# Patient Record
Sex: MALE | Race: WHITE | NOT HISPANIC OR LATINO | Employment: OTHER | URBAN - METROPOLITAN AREA
[De-identification: names, ages, dates, MRNs, and addresses within clinical notes are randomized per-mention and may not be internally consistent; named-entity substitution may affect disease eponyms.]

---

## 2023-02-09 ENCOUNTER — TELEPHONE (OUTPATIENT)
Dept: NEUROSURGERY | Facility: CLINIC | Age: 73
End: 2023-02-09
Payer: MEDICARE

## 2023-02-09 NOTE — TELEPHONE ENCOUNTER
Spoke with patient. Informed him we received a referral for him to be evaluated by Dr. Oro. Patient reports his MRI scan is scheduled on 2/17/23 at [facility]. I advised patient to get a disc of the images and have it shipped to us for his visit. Gave patient address to send disc. Patient states he does not want to come in on 2/21 and 2/22 due to Jaime Nagel. I informed patient that I will speak with Dr. Oro to see if he can see him on 2/23 and 2/24. Patient verbalizes understanding and agreement.

## 2023-02-10 ENCOUNTER — TELEPHONE (OUTPATIENT)
Dept: NEUROSURGERY | Facility: CLINIC | Age: 73
End: 2023-02-10
Payer: MEDICARE

## 2023-02-10 NOTE — TELEPHONE ENCOUNTER
Spoke with patient. Offered him an appointment on 2/28 at 11:30am. Patient verbalizes understanding and agreement. Patient reports he will mail the disc of his MRI as well.

## 2023-02-23 ENCOUNTER — TELEPHONE (OUTPATIENT)
Dept: NEUROSURGERY | Facility: CLINIC | Age: 73
End: 2023-02-23
Payer: MEDICARE

## 2023-02-28 ENCOUNTER — OFFICE VISIT (OUTPATIENT)
Dept: NEUROSURGERY | Facility: CLINIC | Age: 73
End: 2023-02-28
Payer: MEDICARE

## 2023-02-28 DIAGNOSIS — R90.0 INTRACRANIAL MASS: Primary | ICD-10-CM

## 2023-02-28 PROCEDURE — 99205 PR OFFICE/OUTPT VISIT, NEW, LEVL V, 60-74 MIN: ICD-10-PCS | Mod: S$PBB,,, | Performed by: NEUROLOGICAL SURGERY

## 2023-02-28 PROCEDURE — 99999 PR PBB SHADOW E&M-EST. PATIENT-LVL II: ICD-10-PCS | Mod: PBBFAC,,, | Performed by: NEUROLOGICAL SURGERY

## 2023-02-28 PROCEDURE — 99999 PR PBB SHADOW E&M-EST. PATIENT-LVL II: CPT | Mod: PBBFAC,,, | Performed by: NEUROLOGICAL SURGERY

## 2023-02-28 PROCEDURE — 99205 OFFICE O/P NEW HI 60 MIN: CPT | Mod: S$PBB,,, | Performed by: NEUROLOGICAL SURGERY

## 2023-02-28 PROCEDURE — 99212 OFFICE O/P EST SF 10 MIN: CPT | Mod: PBBFAC | Performed by: NEUROLOGICAL SURGERY

## 2023-02-28 RX ORDER — TAMSULOSIN HYDROCHLORIDE 0.4 MG/1
CAPSULE ORAL
COMMUNITY
Start: 2023-01-01

## 2023-02-28 RX ORDER — ACETAMINOPHEN 500 MG
5000 TABLET ORAL
COMMUNITY

## 2023-02-28 RX ORDER — TRIAMCINOLONE ACETONIDE 1 MG/G
CREAM TOPICAL
COMMUNITY

## 2023-02-28 RX ORDER — RAMIPRIL 1.25 MG/1
CAPSULE ORAL
COMMUNITY

## 2023-02-28 RX ORDER — METRONIDAZOLE 7.5 MG/G
CREAM TOPICAL
COMMUNITY

## 2023-02-28 RX ORDER — ASPIRIN 81 MG/1
81 TABLET ORAL
COMMUNITY

## 2023-02-28 RX ORDER — LORATADINE 10 MG/1
10 TABLET ORAL DAILY PRN
COMMUNITY
Start: 2022-11-23

## 2023-02-28 RX ORDER — MULTIVITAMIN
1 TABLET ORAL DAILY
COMMUNITY

## 2023-02-28 RX ORDER — FAMOTIDINE 40 MG/1
TABLET, FILM COATED ORAL
COMMUNITY
Start: 2022-10-21

## 2023-02-28 RX ORDER — ROSUVASTATIN CALCIUM 10 MG/1
TABLET, COATED ORAL
COMMUNITY

## 2023-02-28 RX ORDER — AMOXICILLIN 500 MG
1000 CAPSULE ORAL
COMMUNITY

## 2023-02-28 RX ORDER — FLUTICASONE PROPIONATE 50 MCG
SPRAY, SUSPENSION (ML) NASAL
COMMUNITY
Start: 2023-02-01
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
## 2023-03-02 ENCOUNTER — TELEPHONE (OUTPATIENT)
Dept: NEUROSURGERY | Facility: CLINIC | Age: 73
End: 2023-03-02
Payer: MEDICARE

## 2023-03-02 DIAGNOSIS — R90.0 INTRACRANIAL MASS: Primary | ICD-10-CM

## 2023-07-12 ENCOUNTER — TELEPHONE (OUTPATIENT)
Dept: NEUROSURGERY | Facility: CLINIC | Age: 73
End: 2023-07-12
Payer: MEDICARE

## 2023-07-12 NOTE — TELEPHONE ENCOUNTER
Called and spoke with pt. Orders faxed to Dr. Rueda. Explained to pt needs to make sure CT is done as Dr. Oro ordered with special orders. Highlighted on order. Also, explained need to send disc prior to appt for us to upload. Pt verbalized understanding.

## 2023-07-12 NOTE — TELEPHONE ENCOUNTER
----- Message from Alma Delia Gillespie RN sent at 7/11/2023  4:38 PM CDT -----  Regarding: FW: orders  Contact: @[phone]    ----- Message -----  From: Chon Zhao  Sent: 7/11/2023   1:50 PM CDT  To: [facility] Staff  Subject: orders                                           Pt calling wondering if  could fax orders for mri and ct scan and whatever else needed to pts dr in [place] where he can get images done so he doesn't have to take two trips to [facility].... Dr name deshaun gaona.... phone #[phone]... fax#[phone] ... pls call and adv@[phone] ... states would like dr tobar  include address he would like results sent to after

## 2023-07-25 ENCOUNTER — TELEPHONE (OUTPATIENT)
Dept: NEUROSURGERY | Facility: CLINIC | Age: 73
End: 2023-07-25
Payer: MEDICARE

## 2023-07-25 NOTE — TELEPHONE ENCOUNTER
----- Message from Ting Hogan sent at 7/25/2023  1:07 PM CDT -----  Contact: [phone]  Ajit Anton calling regarding Patient Advice (message) Pt would like a call back from the nurse pt states that he was suppose to have a MRI and a CT done but Pt his disc was power share into [facility] so the office should have a copy.

## 2023-07-25 NOTE — TELEPHONE ENCOUNTER
Called pt to reschedule appt due to Dr. Oro will be out. Rescheduled to 08/29/23. Left vm with new appt info and clinic number to return call.

## 2023-08-03 ENCOUNTER — TELEPHONE (OUTPATIENT)
Dept: NEUROSURGERY | Facility: CLINIC | Age: 73
End: 2023-08-03
Payer: MEDICARE

## 2023-08-03 NOTE — TELEPHONE ENCOUNTER
Returned call to pt. Rescheduled for next available mid morning appt per pt. Pt confirmed rescheduled appt.

## 2023-08-03 NOTE — TELEPHONE ENCOUNTER
----- Message from Mary Jaquez sent at 8/3/2023  9:59 AM CDT -----  Regarding: appt  Contact: pt  Pt calling to get a earlier time for appt on 8-29  would like a morning appt , please call         Confirmed patient's contact info below:  Contact Name: Ajit Santo  Phone Number: [phone]

## 2023-09-21 ENCOUNTER — TELEPHONE (OUTPATIENT)
Dept: NEUROSURGERY | Facility: CLINIC | Age: 73
End: 2023-09-21
Payer: MEDICARE

## 2023-09-21 NOTE — TELEPHONE ENCOUNTER
----- Message from Emily Temple sent at 9/21/2023  9:13 AM CDT -----  Regarding: appt change  Contact: pt [phone]  PATIENTCALL     Pt is calling to speak with someone in provider office regarding he has an appt on 10/02 that he needs to have changed to a virtual appt he states he is having surgery an will not be able to drive here to the visit  is asking for a return call please call pt at  [phone]

## 2023-10-02 ENCOUNTER — TELEPHONE (OUTPATIENT)
Dept: NEUROSURGERY | Facility: CLINIC | Age: 73
End: 2023-10-02
Payer: MEDICARE

## 2023-10-02 NOTE — TELEPHONE ENCOUNTER
Called and spoke with pt. Informed need to cancel appt and will reschedule. Pt verbalized understanding.

## 2023-10-04 ENCOUNTER — TELEPHONE (OUTPATIENT)
Dept: NEUROSURGERY | Facility: CLINIC | Age: 73
End: 2023-10-04
Payer: MEDICARE

## 2023-10-04 NOTE — TELEPHONE ENCOUNTER
----- Message from Lucía Ellis sent at 10/4/2023  1:03 PM CDT -----  Regarding: Appt  Contact: Pt [phone]  Pt is calling to reschedule canceled appt please call

## 2023-10-16 ENCOUNTER — TELEPHONE (OUTPATIENT)
Dept: NEUROSURGERY | Facility: CLINIC | Age: 73
End: 2023-10-16
Payer: MEDICARE

## 2023-10-16 ENCOUNTER — OFFICE VISIT (OUTPATIENT)
Dept: NEUROSURGERY | Facility: CLINIC | Age: 73
End: 2023-10-16
Payer: MEDICARE

## 2023-10-16 DIAGNOSIS — R90.0 INTRACRANIAL MASS: Primary | ICD-10-CM

## 2023-10-16 PROCEDURE — 99215 OFFICE O/P EST HI 40 MIN: CPT | Mod: 95,,, | Performed by: NEUROLOGICAL SURGERY

## 2023-10-16 PROCEDURE — 99215 PR OFFICE/OUTPT VISIT, EST, LEVL V, 40-54 MIN: ICD-10-PCS | Mod: 95,,, | Performed by: NEUROLOGICAL SURGERY

## 2023-10-16 NOTE — TELEPHONE ENCOUNTER
Called and spoke with pt. Stated that Dr. Oro is in with a pt in front of him. Asked the pt to stay on the line and Dr. Oro will be with him in shortly. Pt verbalized understanding.

## 2023-10-16 NOTE — TELEPHONE ENCOUNTER
----- Message from Irasema Tee sent at 10/16/2023  1:48 PM CDT -----  Regarding: Pt Waiting online for Virtual Appt  Contact: [phone]  Patient is waiting on line for virtual appt that was for 130pm.  Please call.
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
## 2023-10-19 ENCOUNTER — TELEPHONE (OUTPATIENT)
Dept: NEUROSURGERY | Facility: CLINIC | Age: 73
End: 2023-10-19
Payer: MEDICARE

## 2023-10-19 DIAGNOSIS — D32.9 MENINGIOMA: ICD-10-CM

## 2023-10-19 DIAGNOSIS — R90.0 INTRACRANIAL MASS: Primary | ICD-10-CM
